# Patient Record
Sex: FEMALE | Race: WHITE | NOT HISPANIC OR LATINO | Employment: UNEMPLOYED | URBAN - NONMETROPOLITAN AREA
[De-identification: names, ages, dates, MRNs, and addresses within clinical notes are randomized per-mention and may not be internally consistent; named-entity substitution may affect disease eponyms.]

---

## 2024-10-02 ENCOUNTER — HOSPITAL ENCOUNTER (EMERGENCY)
Facility: HOSPITAL | Age: 44
Discharge: HOME | End: 2024-10-02
Attending: EMERGENCY MEDICINE
Payer: COMMERCIAL

## 2024-10-02 ENCOUNTER — APPOINTMENT (EMERGENCY)
Dept: RADIOLOGY | Facility: HOSPITAL | Age: 44
End: 2024-10-02
Payer: COMMERCIAL

## 2024-10-02 VITALS
WEIGHT: 105 LBS | RESPIRATION RATE: 18 BRPM | SYSTOLIC BLOOD PRESSURE: 147 MMHG | TEMPERATURE: 97.5 F | OXYGEN SATURATION: 100 % | HEART RATE: 75 BPM | DIASTOLIC BLOOD PRESSURE: 83 MMHG

## 2024-10-02 DIAGNOSIS — S62.101A CLOSED FRACTURE OF RIGHT WRIST, INITIAL ENCOUNTER: Primary | ICD-10-CM

## 2024-10-02 PROCEDURE — 99284 EMERGENCY DEPT VISIT MOD MDM: CPT | Performed by: EMERGENCY MEDICINE

## 2024-10-02 PROCEDURE — 29125 APPL SHORT ARM SPLINT STATIC: CPT | Performed by: EMERGENCY MEDICINE

## 2024-10-02 PROCEDURE — 99284 EMERGENCY DEPT VISIT MOD MDM: CPT

## 2024-10-02 PROCEDURE — 73110 X-RAY EXAM OF WRIST: CPT

## 2024-10-02 NOTE — ED PROVIDER NOTES
Final diagnoses:   Closed fracture of right wrist, initial encounter     ED Disposition       ED Disposition   Discharge    Condition   Stable    Date/Time   Wed Oct 2, 2024  3:48 PM    Comment   Beatrice A Peleschak discharge to home/self care.                   Assessment & Plan       Medical Decision Making  Based on the history and medical screening exam performed the may be at risk for wrist fracture, wrist sprain, wrist contusion.    Based on the work-up performed in the emergency room which includes physical examination, and which may include laboratory studies and imaging as warranted including advanced imaging such as CT scan or ultrasound, the diagnostic considerations are narrowed to exclude limb or life-threatening process.    The patient is stable for discharge.  X-ray reveals distal radius fracture, nondisplaced.  Patient placed in volar short arm Ortho-Glass static wrist splint by ED tech at my request.  Splint was checked postplacement and noted to be appropriately placed.  Patient was given follow-up information for orthopedics.  Patient offered prescription for pain medicine but declines and states she will use Tylenol/ibuprofen at home.    Amount and/or Complexity of Data Reviewed  Radiology: ordered and independent interpretation performed. Decision-making details documented in ED Course.     Details: Distal radius fracture noted             Medications - No data to display    ED Risk Strat Scores                           SBIRT 22yo+      Flowsheet Row Most Recent Value   Initial Alcohol Screen: US AUDIT-C     1. How often do you have a drink containing alcohol? 0 Filed at: 10/02/2024 1505   2. How many drinks containing alcohol do you have on a typical day you are drinking?  0 Filed at: 10/02/2024 1505   3a. Male UNDER 65: How often do you have five or more drinks on one occasion? 0 Filed at: 10/02/2024 1505   3b. FEMALE Any Age, or MALE 65+: How often do you have 4 or more drinks on one  occassion? 0 Filed at: 10/02/2024 1507   Audit-C Score 0 Filed at: 10/02/2024 1504   KAYLEEN: How many times in the past year have you...    Used an illegal drug or used a prescription medication for non-medical reasons? Never Filed at: 10/02/2024 1502                            History of Present Illness       Chief Complaint   Patient presents with    Fall    Wrist Injury     Pt fell at concert last night on wet slippery ground. PT has rightwrist pain       Past Medical History:   Diagnosis Date    Asthma       History reviewed. No pertinent surgical history.   History reviewed. No pertinent family history.   Social History     Tobacco Use    Smoking status: Never    Smokeless tobacco: Never   Vaping Use    Vaping status: Never Used   Substance Use Topics    Alcohol use: Yes     Comment: socially    Drug use: Never      E-Cigarette/Vaping    E-Cigarette Use Never User       E-Cigarette/Vaping Substances      I have reviewed and agree with the history as documented.     Patient suffered FOOSH injury yesterday when she fell and slipped, putting her right hand out to break her fall.  She complains of wrist pain since that time.  No other injuries or complaints      History provided by:  Patient   used: No    Wrist Pain  Location:  Right wrist  Quality:  Aching swelling  Severity:  Moderate  Onset quality:  Sudden  Duration:  1 day  Timing:  Constant  Progression:  Unchanged  Chronicity:  New  Relieved by:  Nothing  Worsened by:  Nothing  Associated symptoms: no abdominal pain, no chest pain, no cough, no diarrhea, no ear pain, no fever, no headaches, no nausea, no rash, no rhinorrhea, no shortness of breath, no sore throat, no vomiting and no wheezing        Review of Systems   Constitutional:  Negative for chills and fever.   HENT:  Negative for ear pain, hearing loss, rhinorrhea, sore throat, trouble swallowing and voice change.    Eyes:  Negative for pain and discharge.   Respiratory:  Negative  for cough, shortness of breath and wheezing.    Cardiovascular:  Negative for chest pain and palpitations.   Gastrointestinal:  Negative for abdominal pain, blood in stool, constipation, diarrhea, nausea and vomiting.   Genitourinary:  Negative for dysuria, flank pain, frequency and hematuria.   Musculoskeletal:  Negative for joint swelling, neck pain and neck stiffness.   Skin:  Negative for rash and wound.   Neurological:  Negative for dizziness, seizures, syncope, facial asymmetry and headaches.   Psychiatric/Behavioral:  Negative for hallucinations, self-injury and suicidal ideas.    All other systems reviewed and are negative.          Objective       ED Triage Vitals [10/02/24 1502]   Temperature Pulse Blood Pressure Respirations SpO2 Patient Position - Orthostatic VS   97.5 °F (36.4 °C) 75 147/83 18 100 % Sitting      Temp Source Heart Rate Source BP Location FiO2 (%) Pain Score    Temporal Monitor Left arm -- 5      Vitals      Date and Time Temp Pulse SpO2 Resp BP Pain Score FACES Pain Rating User   10/02/24 1502 97.5 °F (36.4 °C) 75 100 % 18 147/83 5 -- MY            Physical Exam  Vitals and nursing note reviewed.   Constitutional:       General: She is not in acute distress.     Appearance: She is well-developed. She is not ill-appearing or diaphoretic.   HENT:      Head: Normocephalic and atraumatic.      Right Ear: External ear normal.      Left Ear: External ear normal.   Eyes:      General: No scleral icterus.        Right eye: No discharge.         Left eye: No discharge.      Extraocular Movements: Extraocular movements intact.      Conjunctiva/sclera: Conjunctivae normal.   Pulmonary:      Effort: Pulmonary effort is normal. No respiratory distress.   Musculoskeletal:         General: No deformity or signs of injury. Normal range of motion.      Cervical back: Normal range of motion and neck supple.      Comments: No deformity of the right wrist but there is some diffuse swelling.  There is  tenderness on the radial side.  No particular scaphoid tenderness.  Range of motion limited by pain.  Distal neurovascular is normal.   Skin:     General: Skin is warm and dry.      Coloration: Skin is not jaundiced or pale.   Neurological:      General: No focal deficit present.      Mental Status: She is alert and oriented to person, place, and time.      Cranial Nerves: No cranial nerve deficit.      Coordination: Coordination normal.      Gait: Gait normal.   Psychiatric:         Mood and Affect: Mood normal.         Behavior: Behavior normal.         Thought Content: Thought content normal.         Judgment: Judgment normal.         Results Reviewed       None            XR wrist 3+ views RIGHT   ED Interpretation by Olman Kendrick MD (10/02 1742)   Distal radius fracture noted          Procedures    ED Medication and Procedure Management   None     Patient's Medications    No medications on file     No discharge procedures on file.  ED SEPSIS DOCUMENTATION   Time reflects when diagnosis was documented in both MDM as applicable and the Disposition within this note       Time User Action Codes Description Comment    10/2/2024  3:48 PM Olman Kendrick Add [S62.101A] Closed fracture of right wrist, initial encounter                  Olman Kendrick MD  10/02/24 6578

## 2024-10-02 NOTE — Clinical Note
Beatrice Peleschak was seen and treated in our emergency department on 10/2/2024.        No work until cleared by Family Doctor/Orthopedics        Diagnosis: Wrist fracture    Nancy  .    She may return on this date:     Please excuse any time missed on 10/2/2024    Please call the ED with any questions you may have  [phone]       If you have any questions or concerns, please don't hesitate to call.      Olman Kendrick MD    ______________________________           _______________          _______________  Hospital Representative                              Date                                Time

## 2024-10-04 ENCOUNTER — TELEPHONE (OUTPATIENT)
Dept: OBGYN CLINIC | Facility: CLINIC | Age: 44
End: 2024-10-04

## 2024-10-04 ENCOUNTER — OFFICE VISIT (OUTPATIENT)
Dept: OBGYN CLINIC | Facility: CLINIC | Age: 44
End: 2024-10-04
Payer: COMMERCIAL

## 2024-10-04 VITALS
DIASTOLIC BLOOD PRESSURE: 68 MMHG | SYSTOLIC BLOOD PRESSURE: 98 MMHG | TEMPERATURE: 97.9 F | BODY MASS INDEX: 22.1 KG/M2 | WEIGHT: 112.6 LBS | HEART RATE: 71 BPM | OXYGEN SATURATION: 100 % | HEIGHT: 60 IN

## 2024-10-04 DIAGNOSIS — S52.601A CLOSED FRACTURE OF DISTAL ENDS OF RIGHT RADIUS AND ULNA, INITIAL ENCOUNTER: Primary | ICD-10-CM

## 2024-10-04 DIAGNOSIS — S52.501A CLOSED FRACTURE OF DISTAL ENDS OF RIGHT RADIUS AND ULNA, INITIAL ENCOUNTER: Primary | ICD-10-CM

## 2024-10-04 DIAGNOSIS — M25.531 RIGHT WRIST PAIN: ICD-10-CM

## 2024-10-04 PROCEDURE — 29125 APPL SHORT ARM SPLINT STATIC: CPT | Performed by: STUDENT IN AN ORGANIZED HEALTH CARE EDUCATION/TRAINING PROGRAM

## 2024-10-04 PROCEDURE — 99204 OFFICE O/P NEW MOD 45 MIN: CPT | Performed by: STUDENT IN AN ORGANIZED HEALTH CARE EDUCATION/TRAINING PROGRAM

## 2024-10-04 RX ORDER — TRAMADOL HYDROCHLORIDE 50 MG/1
50 TABLET ORAL EVERY 6 HOURS PRN
Qty: 10 TABLET | Refills: 0 | Status: SHIPPED | OUTPATIENT
Start: 2024-10-04

## 2024-10-04 RX ORDER — IBUPROFEN 200 MG
200 TABLET ORAL EVERY 6 HOURS PRN
COMMUNITY

## 2024-10-04 NOTE — TELEPHONE ENCOUNTER
"While at check out patient informed clerical team that she did have a new patient appointment at her PCP office as a virtual but they never sent her a link to sign on and then \"no showed\" so she was unable to obtain an insurance referral for DOS 10/4/24.    Treating provider ordered a CT scan, clerical team and clinical coordinator were unsure if patient was able to schedule the CT scan without having a PCP and needing and Prior Auth. Informed patient and spouse of this, because the CT scans are very expensive office did not want to have patient pay anymore money out of pocket. Office did not schedule CT scan as of now    Patient and spouse are going to follow back up with PCP office that is listed on the insurance card and see if they can get and appointment ASAP an obtain an insurance referral.    Gave patient office phone number if there are any issues    "

## 2024-10-04 NOTE — PROGRESS NOTES
ASSESSMENT/PLAN:    Assessment:   The patient is young healthy female with a right distal radius fracture.  Although imaging appears to demonstrate a well aligned fracture as there is no significant angulation or shortening I am concerned as there is an intra-articular component and I feel that I can see a depressed cortex with a cortical step-off of the articular surface on the lateral views.  This is concerning for dye punch fracture which in someone of the patient's age could increase the risk for arthritis.  If there is a significant joint step-off this would be an indication for surgery to repair this.  I discussed this with the patient as well as typical fracture healing for the distal radius fracture as described below.  I discussed with the patient that I would like to obtain a CT to further evaluate the joint line.  My hope is that there is not a significant joint step-off and we can manage this nonoperatively, however I did mention to her that if there is I may recommend surgery.  We briefly discussed operative and nonoperative management of distal radius fractures.  The patient expressed understanding.     The physiology of a fractured bone was discussed with the patient today.  With nondisplaced or minimally displaced fractures, conservative treatment often results in a functional recovery.  Typically, these fractures are immobilized in either a cast or splint depending on the pattern.  Radiographs are typically taken at intervals throughout the fracture healing to ensure that muscular forces do not cause loss of reduction or alignment.  If the fracture loses its alignment, surgical intervention may be required to stabilize it.  Medical conditions such as diabetes, osteoporosis, vitamin D deficiency, and a history of or exposure to smoking may delay or prevent fracture healing.      Plan:  New splint applied today  Elevate the arm at rest  Tylenol and Motrin for pain.  Tramadol provided for breakthrough  "pain  CT of the wrist has been ordered  Nonweightbearing right upper extremity    Follow-Up:  Follow-up after CT    _____________________________________________________  CHIEF COMPLAINT:  Chief Complaint   Patient presents with    Right Wrist - Fracture     Hospital ref         SUBJECTIVE:  Beatrice A Peleschak is a 44 y.o. right hand dominant female who presents with right wrist injury.  The patient first noted symptoms 10/1/2024. She was at a concert and tripped down the stadium steps. She states she felt a crunch at time of injury. She didn't not have any immediate swelling. The next morning she notices increased pain an localized swelling. She states moving there thumb causing pain to shoot up forearm. She states she has intense pain at night and had trouble falling asleep and staying asleep. She denies numbness and tingling.  She has no pain in the elbow or proximal arm.  No prior injuries to the wrist.    Previous treatments Volar splint, tylenol and motrin    Occupation: [employer] Nurse, RN      PAST MEDICAL HISTORY:  Past Medical History:   Diagnosis Date    Asthma        PAST SURGICAL HISTORY:  History reviewed. No pertinent surgical history.    FAMILY HISTORY:  History reviewed. No pertinent family history.    SOCIAL HISTORY:  Social History     Tobacco Use    Smoking status: Never    Smokeless tobacco: Never   Vaping Use    Vaping status: Never Used   Substance Use Topics    Alcohol use: Yes     Comment: socially    Drug use: Never       MEDICATIONS:    Current Outpatient Medications:     ibuprofen (MOTRIN) 200 mg tablet, Take 200 mg by mouth every 6 (six) hours as needed for mild pain, Disp: , Rfl:     ALLERGIES:  No Known Allergies    REVIEW OF SYSTEMS:  Pertinent items are noted in HPI.  A comprehensive review of systems was negative.    LABS:  HgA1c: No results found for: \"HGBA1C\"  BMP:   Lab Results   Component Value Date    CALCIUM 9.3 01/06/2022    K 3.9 01/06/2022    CO2 29 01/06/2022     " 01/06/2022    BUN 16 01/06/2022    CREATININE 1.28 (H) 01/06/2022         _____________________________________________________  PHYSICAL EXAMINATION:  Vital signs: BP 98/68   Pulse 71   Temp 97.9 °F (36.6 °C) (Temporal)   Ht 5' (1.524 m)   Wt 51.1 kg (112 lb 9.6 oz)   LMP 10/01/2024 (Exact Date)   SpO2 100%   BMI 21.99 kg/m²   General: well developed and well nourished, alert, oriented times 3, and appears comfortable  Psychiatric: Normal  HEENT: Trachea Midline, No torticollis  Cardiovascular: No discernable arrhythmia  Pulmonary: No wheezing or stridor  Abdomen: No rebound or guarding  Extremities: No peripheral edema  Skin: No masses, erythema, lacerations, fluctation, ulcerations  Neurovascular: Sensation Intact to the Median, Ulnar, Radial Nerve, Motor Intact to the Median, Ulnar, Radial Nerve, and Pulses Intact    MUSCULOSKELETAL EXAMINATION:  Right Hand  Patient presents with no obvious anatomical deformity  Skin is warm and dry to touch with no signs of erythema or infection.  There is diffuse swelling about the wrist.  There is tenderness to palpation over distal radius  There is no tenderness to palpation over the anatomic snuffbox or dorsal scaphoid.  No tenderness throughout the remainder the hand distally.  Range of motion: deferred  Strength: 5/5 ABP, 5/5 FPL, 5/5 FDIOS, 5/5 FDSV, 5/5 EPL  There is no muscle atrophy  FDS intact   FDP intact   FPL intact   wrist and finger extensors intact bilaterally  Wrist, elbow, and shoulder motion within normal limits  Forearm compartments are soft and supple  2+ Distal radial pulse with brisk capillary refill to the fingers  Sensation to light touch grossly intact in the median, radial and ulnar nerve distributions    _____________________________________________________  STUDIES REVIEWED:  I reviewed imaging in PACS from 10/2/2024 of the right wrist which demonstrates an intra-articular distal radius fracture with what appears to be at least a 3 mm  articular step-off.      PROCEDURES PERFORMED:  Splint application    Date/Time: 10/4/2024 10:45 AM    Performed by: Nabil Devlin MD  Authorized by: Nabil Devlin MD  Universal Protocol:  Consent: Verbal consent obtained.  Risks and benefits: risks, benefits and alternatives were discussed  Consent given by: patient  Patient understanding: patient states understanding of the procedure being performed  Radiology Images displayed and confirmed. If images not available, report reviewed: imaging studies available  Patient identity confirmed: verbally with patient    Pre-procedure details:     Sensation:  Normal  Procedure details:     Laterality:  Right    Location:  Wrist    Wrist:  R wristCast type:  Short arm        Splint type:  Volar short arm    Supplies:  Cotton padding and fiberglass        Scribe Attestation      I,:  Adrianne Aviles am acting as a scribe while in the presence of the attending physician.:       I,:  Nabil Devlin MD personally performed the services described in this documentation    as scribed in my presence.:

## 2024-10-09 ENCOUNTER — HOSPITAL ENCOUNTER (OUTPATIENT)
Dept: CT IMAGING | Facility: HOSPITAL | Age: 44
Discharge: HOME | End: 2024-10-09
Attending: STUDENT IN AN ORGANIZED HEALTH CARE EDUCATION/TRAINING PROGRAM
Payer: COMMERCIAL

## 2024-10-09 DIAGNOSIS — S52.601A CLOSED FRACTURE OF DISTAL ENDS OF RIGHT RADIUS AND ULNA, INITIAL ENCOUNTER: ICD-10-CM

## 2024-10-09 DIAGNOSIS — S52.501A CLOSED FRACTURE OF DISTAL ENDS OF RIGHT RADIUS AND ULNA, INITIAL ENCOUNTER: ICD-10-CM

## 2024-10-09 PROCEDURE — 73200 CT UPPER EXTREMITY W/O DYE: CPT

## 2024-10-10 ENCOUNTER — OFFICE VISIT (OUTPATIENT)
Dept: OBGYN CLINIC | Facility: CLINIC | Age: 44
End: 2024-10-10
Payer: COMMERCIAL

## 2024-10-10 VITALS
DIASTOLIC BLOOD PRESSURE: 72 MMHG | HEART RATE: 88 BPM | WEIGHT: 113.4 LBS | SYSTOLIC BLOOD PRESSURE: 110 MMHG | BODY MASS INDEX: 22.26 KG/M2 | HEIGHT: 60 IN | OXYGEN SATURATION: 100 % | TEMPERATURE: 98 F

## 2024-10-10 DIAGNOSIS — S52.601A CLOSED FRACTURE OF DISTAL ENDS OF RIGHT RADIUS AND ULNA, INITIAL ENCOUNTER: Primary | ICD-10-CM

## 2024-10-10 DIAGNOSIS — S52.501A CLOSED FRACTURE OF DISTAL ENDS OF RIGHT RADIUS AND ULNA, INITIAL ENCOUNTER: Primary | ICD-10-CM

## 2024-10-10 PROCEDURE — 99213 OFFICE O/P EST LOW 20 MIN: CPT | Performed by: STUDENT IN AN ORGANIZED HEALTH CARE EDUCATION/TRAINING PROGRAM

## 2024-10-10 RX ORDER — IPRATROPIUM BROMIDE AND ALBUTEROL SULFATE 2.5; .5 MG/3ML; MG/3ML
3 SOLUTION RESPIRATORY (INHALATION) 4 TIMES DAILY
COMMUNITY

## 2024-10-10 RX ORDER — ALBUTEROL SULFATE 1.25 MG/3ML
1.25 SOLUTION RESPIRATORY (INHALATION) EVERY 6 HOURS PRN
COMMUNITY

## 2024-10-10 NOTE — LETTER
October 11, 2024     Patient: Beatrice A Peleschak  YOB: 1980  Date of Visit: 10/10/2024      To Whom it May Concern:    Beatrice Peleschak is under my professional care. Nancy was seen in my office on 10/10/2024. Nancy may return to work immediately however she has injured her right hand.  As such she should not use her right hand for any activities.  I want her putting no weight through the right hand with no lifting.  This will be for 6 weeks.  Starting November 12, 2024 I will remove her cast and her weight limit will be lifting 5 pounds or less.  She will also need to wear a removable wrist brace while at work.  This will be for 2 weeks and will end on November 26, 2024.  Starting November 26 she can use her right hand as she tolerates with full weightbearing.    If you have any questions or concerns, please don't hesitate to call.         Sincerely,          Nabil Devlin MD        CC: No Recipients

## 2024-10-10 NOTE — PROGRESS NOTES
Orthopedic Surgery Management Note  Beatrice A Peleschak (44 y.o. female)  : 1980 Encounter Date: 10/10/2024    Assessment/Plan:  44 y.o. female 9 days out from a right distal radius fracture.  At her last visit based on her imaging I was concerned for a dye punch fracture pattern and therefore I obtained a CT.  Fortunately the CT demonstrated a distal radius fracture with intra-articular extension however no significant dye punch component.  As such we can continue with nonoperative management. After thorough discussion with the patient and considering the risks, benefits and alternatives, we have decided on closed treatment of this fracture. Patient is aware that the fracture may displace or fail to heal and that surgery may be required in the future. Every fracture is associated with soft tissue injury as well.  These injuries may not heal and may require surgical intervention. Miladys-articular fractures are especially prone to post traumatic arthritis and may require surgical intervention in the future.       Immobilization: Right upper extremity splint  None WB to right upper extremity  Pain control: Tylenol Motrin  Continue ice packs/elevation    Follow-up in 1 week with new x-rays      Subjective:  44 y.o. female presenting to the office for 1 week follow-up from a right distal radius fracture.  She has been compliant in the splint.  Her pain has been well-controlled.  She has no numbness or tingling.  She returns today to follow-up on the results of her CT.  DOI: 10/1/24        Review of Systems  Review of systems negative unless otherwise specified in HPI    Past Medical History  Past Medical History:   Diagnosis Date    Asthma      Past Surgical History  No past surgical history on file.  Current Medications  Current Outpatient Medications on File Prior to Visit   Medication Sig Dispense Refill    ibuprofen (MOTRIN) 200 mg tablet Take 200 mg by mouth every 6 (six) hours as needed for mild pain       traMADol (Ultram) 50 mg tablet Take 1 tablet (50 mg total) by mouth every 6 (six) hours as needed for moderate pain 10 tablet 0     No current facility-administered medications on file prior to visit.       Physical exam  Exam: right forearm  Skin intact, splint in place clean and dry  Mild swelling of the exposed fingers  She is wiggling the exposed fingers  Motor Exam: She is moving the fingers and able to demonstrate intact AIN PIN and IO for motor  Sensory Exam: Sensation intact to light touch in FDWS (radial), volar IF (median), volar SF (ulnar)  Vascular Exam: < 2 sec capillary refill         Imaging:  Study type: CT right wrist(s)  Date: 10/9/2024  I have personally reviewed the imaging in PACS and my impression is as follows: Patient the x-ray of the wrist demonstrates a distal radius fracture with intra-articular extension.  However there is a congruent articular surface with no significant step-off.    Scribe Attestation      I,:   am acting as a scribe while in the presence of the attending physician.:       I,:   personally performed the services described in this documentation    as scribed in my presence.:

## 2024-10-11 ENCOUNTER — TELEPHONE (OUTPATIENT)
Dept: OBGYN CLINIC | Facility: CLINIC | Age: 44
End: 2024-10-11

## 2024-10-11 ENCOUNTER — TELEPHONE (OUTPATIENT)
Dept: OBGYN CLINIC | Facility: HOSPITAL | Age: 44
End: 2024-10-11

## 2024-10-11 NOTE — TELEPHONE ENCOUNTER
Patient stopped in office to  completed  return to work papers done by Forms department  Patient said you told her she can go back to work but form states anticipated return to work full duty is 11/13/24  Please verify. Patient also said you didn't give her return to work note.    [phone]

## 2024-10-11 NOTE — TELEPHONE ENCOUNTER
Dr. Devlin did a work note for patient. Dates need to be changed on return to work status report form  Please call patient when corrected/completed. [phone]

## 2024-10-17 ENCOUNTER — HOSPITAL ENCOUNTER (OUTPATIENT)
Dept: RADIOLOGY | Facility: CLINIC | Age: 44
End: 2024-10-17
Payer: COMMERCIAL

## 2024-10-17 ENCOUNTER — OFFICE VISIT (OUTPATIENT)
Dept: OBGYN CLINIC | Facility: CLINIC | Age: 44
End: 2024-10-17
Payer: COMMERCIAL

## 2024-10-17 VITALS
WEIGHT: 111 LBS | BODY MASS INDEX: 21.79 KG/M2 | TEMPERATURE: 97.5 F | HEIGHT: 60 IN | SYSTOLIC BLOOD PRESSURE: 102 MMHG | OXYGEN SATURATION: 100 % | HEART RATE: 63 BPM | DIASTOLIC BLOOD PRESSURE: 64 MMHG

## 2024-10-17 DIAGNOSIS — S52.601D CLOSED FRACTURE OF DISTAL ENDS OF RIGHT RADIUS AND ULNA WITH ROUTINE HEALING, SUBSEQUENT ENCOUNTER: Primary | ICD-10-CM

## 2024-10-17 DIAGNOSIS — S52.601A CLOSED FRACTURE OF DISTAL ENDS OF RIGHT RADIUS AND ULNA, INITIAL ENCOUNTER: ICD-10-CM

## 2024-10-17 DIAGNOSIS — S52.501D CLOSED FRACTURE OF DISTAL ENDS OF RIGHT RADIUS AND ULNA WITH ROUTINE HEALING, SUBSEQUENT ENCOUNTER: Primary | ICD-10-CM

## 2024-10-17 DIAGNOSIS — S52.501A CLOSED FRACTURE OF DISTAL ENDS OF RIGHT RADIUS AND ULNA, INITIAL ENCOUNTER: ICD-10-CM

## 2024-10-17 PROCEDURE — 99213 OFFICE O/P EST LOW 20 MIN: CPT | Performed by: STUDENT IN AN ORGANIZED HEALTH CARE EDUCATION/TRAINING PROGRAM

## 2024-10-17 PROCEDURE — 73110 X-RAY EXAM OF WRIST: CPT

## 2024-10-17 PROCEDURE — 29075 APPL CST ELBW FNGR SHORT ARM: CPT | Performed by: STUDENT IN AN ORGANIZED HEALTH CARE EDUCATION/TRAINING PROGRAM

## 2024-10-17 NOTE — PROGRESS NOTES
Orthopedic Surgery Management Note  Beatrice A Peleschak (44 y.o. female)  : 1980 Encounter Date: 10/17/2024    Assessment/Plan:  44 y.o. female 2 weeks follow up status post right distal radius fracture.  The fracture remains well aligned on today's imaging.  I discussed that as long as this fracture alignment can be maintained I would expect her to have a excellent outcome.  We converted her to a cast today.  She will follow-up in 1 week for 1 more repeat x-ray to make sure there is no instability of the fracture in the cast.  At that time we will convert her to a 3-week follow-up for full 6 weeks of immobilization.  We again discussed that there is a possibility of loss of reduction and if that were the case given her young age and active lifestyle I might consider surgery as a recommendation.  However at this time we can continue nonoperative management.  The patient and her  expressed understanding.    Immobilization: splint removed today and transitioned to short arm cast  NWB to right upper extremity  Pain control: may take over-the-counter analgesics as needed  Continue ice packs/elevation  Elevate right upper extremity  Return in about 1 week (around 10/24/2024).  for evaluation with x-ray of right wrist      Subjective:  44 y.o. female presenting to the office for 2 weeks follow up, status post right distal radius fracture.  DOI: 10/01/2024    Patient states that their pain is present - adequately treated   Patient denies any other radicular symptoms.   Patient denies any instability.     Current concerns: Patient maintained splint as directed, but states it is itchy. Patient notes a burning sensation in the right wrist.     Numbness/weakness extremity: None Reported   Physical Therapy Progress: None  DVT ppx: N/A  Patient denies symptoms of an infection.     Review of Systems  Review of systems negative unless otherwise specified in HPI    Past Medical History  Past Medical History:    Diagnosis Date    Asthma      Past Surgical History  History reviewed. No pertinent surgical history.  Current Medications  Current Outpatient Medications on File Prior to Visit   Medication Sig Dispense Refill    albuterol (ACCUNEB) 1.25 MG/3ML nebulizer solution Take 1.25 mg by nebulization every 6 (six) hours as needed for wheezing      ibuprofen (MOTRIN) 200 mg tablet Take 200 mg by mouth every 6 (six) hours as needed for mild pain      ipratropium-albuterol (DUO-NEB) 0.5-2.5 mg/3 mL nebulizer solution Take 3 mL by nebulization 4 (four) times a day (Patient not taking: Reported on 10/17/2024)      traMADol (Ultram) 50 mg tablet Take 1 tablet (50 mg total) by mouth every 6 (six) hours as needed for moderate pain (Patient not taking: Reported on 10/17/2024) 10 tablet 0     No current facility-administered medications on file prior to visit.       Physical exam  Exam: right wrist  Skin intact  No soft tissue swelling  TTP distal radius  Elbow: extension/flexion intact  Wrist: extension/flexion not assessed due to fracture  Digit: full AROM in DIP, PIP, MP joints  Motor Exam: firing AIN/PIN/U  Sensory Exam: Sensation intact to light touch in FDWS (radial), volar IF (median), volar SF (ulnar)  Vascular Exam: < 2 sec capillary refill         Imaging:  Study type: XRAY right wrist(s)  Date: 10/17/2024  I have personally reviewed the imaging in PACS and my impression is as follows: intra-articular distal radius fracture stable compared to prior films without further displacement    Cast application    Date/Time: 10/17/2024 8:15 AM    Performed by: Nabil Devlin MD  Authorized by: Nabil Devlin MD  Universal Protocol:  Consent: Verbal consent obtained.  Risks and benefits: risks, benefits and alternatives were discussed  Consent given by: patient  Patient understanding: patient states understanding of the procedure being performed  Patient identity confirmed: verbally with patient    Procedure details:      Laterality:  Right    Location:  WristCast type:  Short arm        Supplies:  3 layer wrap, cotton padding, fiberglass and skin protective strip  Post-procedure details:     Pain:  Unchanged    Sensation:  Unchanged    Patient tolerance of procedure:  Tolerated well, no immediate complications        Scribe Attestation      I,:  Nathaly Mcbride PA-C am acting as a scribe while in the presence of the attending physician.:       I,:  Nabil Devlin MD personally performed the services described in this documentation    as scribed in my presence.:

## 2024-10-24 ENCOUNTER — HOSPITAL ENCOUNTER (OUTPATIENT)
Dept: RADIOLOGY | Facility: CLINIC | Age: 44
End: 2024-10-24
Payer: COMMERCIAL

## 2024-10-24 ENCOUNTER — OFFICE VISIT (OUTPATIENT)
Dept: OBGYN CLINIC | Facility: CLINIC | Age: 44
End: 2024-10-24
Payer: COMMERCIAL

## 2024-10-24 VITALS
TEMPERATURE: 96.2 F | DIASTOLIC BLOOD PRESSURE: 62 MMHG | HEART RATE: 70 BPM | HEIGHT: 60 IN | OXYGEN SATURATION: 100 % | BODY MASS INDEX: 21.13 KG/M2 | WEIGHT: 107.6 LBS | SYSTOLIC BLOOD PRESSURE: 112 MMHG

## 2024-10-24 DIAGNOSIS — S52.501D CLOSED FRACTURE OF DISTAL ENDS OF RIGHT RADIUS AND ULNA WITH ROUTINE HEALING, SUBSEQUENT ENCOUNTER: ICD-10-CM

## 2024-10-24 DIAGNOSIS — S52.601D CLOSED FRACTURE OF DISTAL ENDS OF RIGHT RADIUS AND ULNA WITH ROUTINE HEALING, SUBSEQUENT ENCOUNTER: ICD-10-CM

## 2024-10-24 DIAGNOSIS — S52.601D CLOSED FRACTURE OF DISTAL ENDS OF RIGHT RADIUS AND ULNA WITH ROUTINE HEALING, SUBSEQUENT ENCOUNTER: Primary | ICD-10-CM

## 2024-10-24 DIAGNOSIS — S52.501D CLOSED FRACTURE OF DISTAL ENDS OF RIGHT RADIUS AND ULNA WITH ROUTINE HEALING, SUBSEQUENT ENCOUNTER: Primary | ICD-10-CM

## 2024-10-24 PROCEDURE — 99213 OFFICE O/P EST LOW 20 MIN: CPT | Performed by: STUDENT IN AN ORGANIZED HEALTH CARE EDUCATION/TRAINING PROGRAM

## 2024-10-24 PROCEDURE — 73110 X-RAY EXAM OF WRIST: CPT

## 2024-10-24 NOTE — PROGRESS NOTES
Orthopedic Surgery Management Note  Beatrice A Peleschak (44 y.o. female)  : 1980 Encounter Date: 10/24/2024    Assessment/Plan:  44 y.o. female 3 week follow up status post right distal radius fracture. She is doing well with cat immobilization.  Her distal radius remains well aligned on imaging today.  She is having no issues with the cast.  We discussed that we will continue this plan and I will see her in 3 weeks for cast removal and final x-rays.    Immobilization: short arm cast  NWB to right upper extremity  Pain control: OTC medications as needed   Continue ice packs/elevation  Remain in cast for 3 weeks   Return in about 3 weeks (around 2024) for Repeat X-Ray.  for evaluation with x-ray      Subjective:  44 y.o. female presenting to the office for 3 week follow up, status post right distal radius fracture.  She has no complaints at this time.  DOI: 10/1/2024    Patient states that their pain is present - adequately treated   Patient denies any other radicular symptoms.   Patient denies any instability.     Current concerns: The patient is doing well in the short arm cast. She states that the swelling has improved over the past 2 weeks and she is more comfortable today. She states that her pain is well controlled. She has returned to work, without any issues.     Numbness/weakness extremity: None Reported   Physical Therapy Progress: not yet started   DVT ppx: not needed at this time   Patient denies symptoms of an infection.     Review of Systems  Review of systems negative unless otherwise specified in HPI    Past Medical History  Past Medical History:   Diagnosis Date    Asthma      Past Surgical History  History reviewed. No pertinent surgical history.  Current Medications  Current Outpatient Medications on File Prior to Visit   Medication Sig Dispense Refill    albuterol (ACCUNEB) 1.25 MG/3ML nebulizer solution Take 1.25 mg by nebulization every 6 (six) hours as needed for wheezing       ibuprofen (MOTRIN) 200 mg tablet Take 200 mg by mouth every 6 (six) hours as needed for mild pain      ipratropium-albuterol (DUO-NEB) 0.5-2.5 mg/3 mL nebulizer solution Take 3 mL by nebulization 4 (four) times a day (Patient not taking: Reported on 10/17/2024)      traMADol (Ultram) 50 mg tablet Take 1 tablet (50 mg total) by mouth every 6 (six) hours as needed for moderate pain (Patient not taking: Reported on 10/17/2024) 10 tablet 0     No current facility-administered medications on file prior to visit.       Physical exam  Exam: right distal radius fracture   Skin intact  No soft tissue swelling  Elbow: extension/flexion 0/140  Forearm: pronation/supination not tested due to immobilization    Wrist: not tested due to immobilization    Digit: full AROM in DIP, PIP, MP joints  Motor Exam: firing AIN/PIN/U  Sensory Exam: Sensation intact to light touch in FDWS (radial), volar IF (median), volar SF (ulnar)  Vascular Exam: < 2 sec capillary refill         Imaging:  Study type: XRAY right wrist(s)  Date: 10/24/2024  I have personally reviewed the imaging in PACS and my impression is as follows: interval healing of distal radius fracture. Maintained anatomical alignment.       Scribe Attestation      I,:  Mariam Fitzgerald am acting as a scribe while in the presence of the attending physician.:       I,:  Nabil Devlin MD personally performed the services described in this documentation    as scribed in my presence.:

## 2024-11-14 ENCOUNTER — HOSPITAL ENCOUNTER (OUTPATIENT)
Dept: RADIOLOGY | Facility: CLINIC | Age: 44
End: 2024-11-14
Payer: COMMERCIAL

## 2024-11-14 ENCOUNTER — OFFICE VISIT (OUTPATIENT)
Dept: OBGYN CLINIC | Facility: CLINIC | Age: 44
End: 2024-11-14
Payer: COMMERCIAL

## 2024-11-14 VITALS
WEIGHT: 109.8 LBS | BODY MASS INDEX: 21.55 KG/M2 | HEART RATE: 72 BPM | DIASTOLIC BLOOD PRESSURE: 58 MMHG | TEMPERATURE: 98 F | OXYGEN SATURATION: 99 % | SYSTOLIC BLOOD PRESSURE: 102 MMHG | HEIGHT: 60 IN

## 2024-11-14 DIAGNOSIS — S52.501D CLOSED FRACTURE OF DISTAL ENDS OF RIGHT RADIUS AND ULNA WITH ROUTINE HEALING, SUBSEQUENT ENCOUNTER: Primary | ICD-10-CM

## 2024-11-14 DIAGNOSIS — S52.601D CLOSED FRACTURE OF DISTAL ENDS OF RIGHT RADIUS AND ULNA WITH ROUTINE HEALING, SUBSEQUENT ENCOUNTER: ICD-10-CM

## 2024-11-14 DIAGNOSIS — S52.501D CLOSED FRACTURE OF DISTAL ENDS OF RIGHT RADIUS AND ULNA WITH ROUTINE HEALING, SUBSEQUENT ENCOUNTER: ICD-10-CM

## 2024-11-14 DIAGNOSIS — S52.601D CLOSED FRACTURE OF DISTAL ENDS OF RIGHT RADIUS AND ULNA WITH ROUTINE HEALING, SUBSEQUENT ENCOUNTER: Primary | ICD-10-CM

## 2024-11-14 PROCEDURE — 99213 OFFICE O/P EST LOW 20 MIN: CPT | Performed by: STUDENT IN AN ORGANIZED HEALTH CARE EDUCATION/TRAINING PROGRAM

## 2024-11-14 PROCEDURE — 73110 X-RAY EXAM OF WRIST: CPT

## 2024-11-14 NOTE — PROGRESS NOTES
Orthopedic Surgery Management Note  Beatrice A Peleschak (44 y.o. female)  : 1980 Encounter Date: 2024    Assessment/Plan:  44 y.o. female 6 weeks follow up status post right distal radius fracture.  The fracture remains well aligned on imaging today.  She has no tenderness about the area.  Her wrist is a little stiff on examination.  Otherwise she is doing quite well.    Xrays of right wrist obtained today.   Immobilization: cast was removed today. Provided removable wrist brace for patient to wear daily.   Weight bearing: May start to progress weightbearing up to 5lbs  Pain control: over-the-counter analgesics as needed  Provided hand PT/OT referral to help improve wrist ROM and provide home exercise program.   Return in about 6 weeks (around 2024).  for evaluation with x-ray right wrist      Subjective:  44 y.o. female presenting to the office for 6 weeks follow up, status post right distal radius fracture.   DOI: 10/01/2024    Patient states that their pain is present - adequately treated   Patient denies symptoms of an infection.     Current concerns: Patient has maintained cast on right wrist as directed.     Review of Systems  Review of systems negative unless otherwise specified in HPI    Past Medical History  Past Medical History:   Diagnosis Date    Asthma      Past Surgical History  No past surgical history on file.  Current Medications  Current Outpatient Medications on File Prior to Visit   Medication Sig Dispense Refill    albuterol (ACCUNEB) 1.25 MG/3ML nebulizer solution Take 1.25 mg by nebulization every 6 (six) hours as needed for wheezing      ibuprofen (MOTRIN) 200 mg tablet Take 200 mg by mouth every 6 (six) hours as needed for mild pain      ipratropium-albuterol (DUO-NEB) 0.5-2.5 mg/3 mL nebulizer solution Take 3 mL by nebulization 4 (four) times a day (Patient not taking: Reported on 10/17/2024)      traMADol (Ultram) 50 mg tablet Take 1 tablet (50 mg total) by mouth every  6 (six) hours as needed for moderate pain (Patient not taking: Reported on 10/17/2024) 10 tablet 0     No current facility-administered medications on file prior to visit.       Physical exam  Exam: right wrist  Inspection: Skin intact. No soft tissue swelling.  No tenderness about the wrist. she is able to make a full composite fist  Range of Motion: Flexion 30 degrees. Extension 10 degrees.   Neurovascular status: Neuro intact, good cap refill      Imaging:  Study type: XRAY right wrist(s)  Date: 11/14/2024  I have personally reviewed the imaging in PACS and my impression is as follows: distal radius fracture in stable alignment with increased callus formation.     Scribe Attestation      I,:  Nathaly Mcbride PA-C am acting as a scribe while in the presence of the attending physician.:       I,:  Nabil Devlin MD personally performed the services described in this documentation    as scribed in my presence.:

## 2024-11-29 ENCOUNTER — TELEPHONE (OUTPATIENT)
Dept: OBGYN CLINIC | Facility: CLINIC | Age: 44
End: 2024-11-29

## 2024-11-29 NOTE — TELEPHONE ENCOUNTER
LVM (2nd time) appointment with Dr. Devlin on 12/26/24 needs to be rescheduled and to call back to r/s

## 2024-12-27 ENCOUNTER — OFFICE VISIT (OUTPATIENT)
Dept: OBGYN CLINIC | Facility: CLINIC | Age: 44
End: 2024-12-27
Payer: COMMERCIAL

## 2024-12-27 ENCOUNTER — HOSPITAL ENCOUNTER (OUTPATIENT)
Dept: RADIOLOGY | Facility: CLINIC | Age: 44
End: 2024-12-27
Payer: COMMERCIAL

## 2024-12-27 VITALS
HEART RATE: 61 BPM | OXYGEN SATURATION: 98 % | WEIGHT: 108.69 LBS | TEMPERATURE: 98 F | SYSTOLIC BLOOD PRESSURE: 112 MMHG | BODY MASS INDEX: 21.34 KG/M2 | DIASTOLIC BLOOD PRESSURE: 72 MMHG | HEIGHT: 60 IN

## 2024-12-27 DIAGNOSIS — S52.601D CLOSED FRACTURE OF DISTAL ENDS OF RIGHT RADIUS AND ULNA WITH ROUTINE HEALING, SUBSEQUENT ENCOUNTER: ICD-10-CM

## 2024-12-27 DIAGNOSIS — S52.501D CLOSED FRACTURE OF DISTAL ENDS OF RIGHT RADIUS AND ULNA WITH ROUTINE HEALING, SUBSEQUENT ENCOUNTER: ICD-10-CM

## 2024-12-27 DIAGNOSIS — S52.501D CLOSED FRACTURE OF DISTAL ENDS OF RIGHT RADIUS AND ULNA WITH ROUTINE HEALING, SUBSEQUENT ENCOUNTER: Primary | ICD-10-CM

## 2024-12-27 DIAGNOSIS — S52.601D CLOSED FRACTURE OF DISTAL ENDS OF RIGHT RADIUS AND ULNA WITH ROUTINE HEALING, SUBSEQUENT ENCOUNTER: Primary | ICD-10-CM

## 2024-12-27 PROCEDURE — 99213 OFFICE O/P EST LOW 20 MIN: CPT | Performed by: STUDENT IN AN ORGANIZED HEALTH CARE EDUCATION/TRAINING PROGRAM

## 2024-12-27 PROCEDURE — 73110 X-RAY EXAM OF WRIST: CPT

## 2024-12-27 NOTE — PROGRESS NOTES
Orthopedic Surgery Management Note  Beatrice A Peleschak (44 y.o. female)  : 1980 Encounter Date: 2024    Assessment/Plan:  44 y.o. female 3 months follow up status post closed reduction and immobilization for a right distal radius fracture.  Fracture demonstrates radiographic healing with excellent alignment.  Her wrist is a little stiff today.  She reports she has been doing her range of motion exercises.  I encouraged her to try to focus more on range of motion.    Immobilization: None  Weightbearing as tolerated -for work I have put her on a 20 pound lifting limit for the next 2 weeks as she does have some  weakness and I would like her to wean back into full duty.  Range of motion as tolerated  Tylenol and oral anti-inflammatories can be taken for pain. The patient was advised that NSAID-type medications have some very important potential side effects including: gastrointestinal irritation including hemorrhage and renal injuries.   Next Visit: As needed      Subjective:  44 y.o. female presenting to the office for 3 month follow up, status post right distal radius fracture.  She was converted from a cast to a removable wrist splint at the last visit 6 weeks ago.  At that time she was started on physical therapy for wrist range of motion exercises.  She states that she has been doing this.  She does have some pain in the wrist still but this is focused primarily on the ulnar aspect of the wrist.  The area over the distal radius itself has minimal to no pain.  No numbness or tingling.  She has returned to work but feels somewhat limited at work due to the pain in the ulnar side of her wrist.  DOI: 10/1/2024    Patient states that their pain is present - adequately treated   Patient denies symptoms of an infection.       Review of Systems  Review of systems negative unless otherwise specified in HPI    Past Medical History  Past Medical History:   Diagnosis Date    Asthma      Past Surgical  History  History reviewed. No pertinent surgical history.  Current Medications  Current Outpatient Medications on File Prior to Visit   Medication Sig Dispense Refill    albuterol (ACCUNEB) 1.25 MG/3ML nebulizer solution Take 1.25 mg by nebulization every 6 (six) hours as needed for wheezing      ibuprofen (MOTRIN) 200 mg tablet Take 200 mg by mouth every 6 (six) hours as needed for mild pain      ipratropium-albuterol (DUO-NEB) 0.5-2.5 mg/3 mL nebulizer solution Take 3 mL by nebulization 4 (four) times a day      traMADol (Ultram) 50 mg tablet Take 1 tablet (50 mg total) by mouth every 6 (six) hours as needed for moderate pain (Patient not taking: Reported on 10/17/2024) 10 tablet 0     No current facility-administered medications on file prior to visit.       Physical exam  Exam: right wrist  Skin: Clean, dry, intact  No tenderness to palpation about the distal radius but she does have some tenderness over the fovea and distal ulnar styloid.  No tenderness about the bony prominences of the scaphoid, the SL interval, the LT interval, or the DRUJ.  Range of Motion: She has slightly limited wrist range of motion with flexion to 40 degrees and extension to about 50 degrees.  Pronosupination is to 80 degrees in both directions.  She is able to make a full composite fist.  Neurovascular status: Neuro intact, good cap refill  Activity Restrictions: No restrictions          Imaging:  Study type: XRAY right wrist(s)  Date: 12/27/2024  I have personally reviewed the imaging in PACS and my impression is as follows: Distal radius fracture with near anatomic alignment and interval evidence of fracture healing